# Patient Record
Sex: MALE | Race: WHITE | NOT HISPANIC OR LATINO | Employment: PART TIME | URBAN - METROPOLITAN AREA
[De-identification: names, ages, dates, MRNs, and addresses within clinical notes are randomized per-mention and may not be internally consistent; named-entity substitution may affect disease eponyms.]

---

## 2022-12-27 ENCOUNTER — TELEPHONE (OUTPATIENT)
Dept: SURGERY | Facility: CLINIC | Age: 37
End: 2022-12-27
Payer: MEDICAID

## 2022-12-27 NOTE — TELEPHONE ENCOUNTER
----- Message from Kerry Dahl sent at 12/27/2022 12:01 PM CST -----  Contact: Jc  Patient is calling to speak with the nurse in regards to referral. Reports spoke to Nurse Cordon earlier and forgot to advise that he wanted to be seen at the [place] office. Adv patient referral still has not come in. Patient states referring provider is Rosa Maria Aquino ph [phone]. Please give patient a callback at .[phone]

## 2022-12-27 NOTE — TELEPHONE ENCOUNTER
Returned call to patient he is aware if his appointment date and time with Dr. Colon on 12/05/2022 to discuss possible procedure for infected cyst. The patient voiced understanding.

## 2023-01-05 ENCOUNTER — OFFICE VISIT (OUTPATIENT)
Dept: SURGERY | Facility: CLINIC | Age: 38
End: 2023-01-05
Payer: MEDICAID

## 2023-01-05 VITALS — HEART RATE: 114 BPM | DIASTOLIC BLOOD PRESSURE: 121 MMHG | SYSTOLIC BLOOD PRESSURE: 163 MMHG | WEIGHT: 251 LBS

## 2023-01-05 DIAGNOSIS — L05.01 PILONIDAL CYST WITH ABSCESS: Primary | ICD-10-CM

## 2023-01-05 DIAGNOSIS — E78.00 HIGH CHOLESTEROL: ICD-10-CM

## 2023-01-05 DIAGNOSIS — F31.9 BIPOLAR DEPRESSION: ICD-10-CM

## 2023-01-05 PROBLEM — I10 HTN (HYPERTENSION): Status: ACTIVE | Noted: 2023-01-05

## 2023-01-05 PROCEDURE — 3077F SYST BP >= 140 MM HG: CPT | Mod: CPTII,,, | Performed by: SURGERY

## 2023-01-05 PROCEDURE — 3080F DIAST BP >= 90 MM HG: CPT | Mod: CPTII,,, | Performed by: SURGERY

## 2023-01-05 PROCEDURE — 3080F PR MOST RECENT DIASTOLIC BLOOD PRESSURE >= 90 MM HG: ICD-10-PCS | Mod: CPTII,,, | Performed by: SURGERY

## 2023-01-05 PROCEDURE — 99999 PR PBB SHADOW E&M-EST. PATIENT-LVL III: CPT | Mod: PBBFAC,,, | Performed by: SURGERY

## 2023-01-05 PROCEDURE — 1159F PR MEDICATION LIST DOCUMENTED IN MEDICAL RECORD: ICD-10-PCS | Mod: CPTII,,, | Performed by: SURGERY

## 2023-01-05 PROCEDURE — 99204 PR OFFICE/OUTPT VISIT, NEW, LEVL IV, 45-59 MIN: ICD-10-PCS | Mod: S$PBB,,, | Performed by: SURGERY

## 2023-01-05 PROCEDURE — 3077F PR MOST RECENT SYSTOLIC BLOOD PRESSURE >= 140 MM HG: ICD-10-PCS | Mod: CPTII,,, | Performed by: SURGERY

## 2023-01-05 PROCEDURE — 99999 PR PBB SHADOW E&M-EST. PATIENT-LVL III: ICD-10-PCS | Mod: PBBFAC,,, | Performed by: SURGERY

## 2023-01-05 PROCEDURE — 99213 OFFICE O/P EST LOW 20 MIN: CPT | Mod: PBBFAC,PO | Performed by: SURGERY

## 2023-01-05 PROCEDURE — 1160F PR REVIEW ALL MEDS BY PRESCRIBER/CLIN PHARMACIST DOCUMENTED: ICD-10-PCS | Mod: CPTII,,, | Performed by: SURGERY

## 2023-01-05 PROCEDURE — 1159F MED LIST DOCD IN RCRD: CPT | Mod: CPTII,,, | Performed by: SURGERY

## 2023-01-05 PROCEDURE — 99204 OFFICE O/P NEW MOD 45 MIN: CPT | Mod: S$PBB,,, | Performed by: SURGERY

## 2023-01-05 PROCEDURE — 1160F RVW MEDS BY RX/DR IN RCRD: CPT | Mod: CPTII,,, | Performed by: SURGERY

## 2023-01-05 RX ORDER — IBUPROFEN 600 MG/1
600 TABLET ORAL
COMMUNITY
Start: 2022-09-22

## 2023-01-05 RX ORDER — ONDANSETRON 4 MG/1
8 TABLET, ORALLY DISINTEGRATING ORAL EVERY 8 HOURS PRN
Status: CANCELLED | OUTPATIENT
Start: 2023-01-05

## 2023-01-05 RX ORDER — ATORVASTATIN CALCIUM 20 MG/1
20 TABLET, FILM COATED ORAL
COMMUNITY
Start: 2022-12-24 | End: 2023-01-05

## 2023-01-05 RX ORDER — ERGOCALCIFEROL 1.25 MG/1
50000 CAPSULE ORAL
COMMUNITY
Start: 2022-11-28

## 2023-01-05 RX ORDER — LIDOCAINE HYDROCHLORIDE 10 MG/ML
1 INJECTION, SOLUTION EPIDURAL; INFILTRATION; INTRACAUDAL; PERINEURAL ONCE
Status: DISCONTINUED | OUTPATIENT
Start: 2023-01-05 | End: 2023-01-13 | Stop reason: HOSPADM

## 2023-01-05 RX ORDER — ATORVASTATIN CALCIUM 20 MG/1
TABLET, FILM COATED ORAL
COMMUNITY
Start: 2022-11-28

## 2023-01-05 NOTE — PATIENT INSTRUCTIONS
For pilonidal cyst excision surgery scheduled for January 13th at 10 in the morning.  The hospital call you the night before with a time of arrival.  There are no medications you have to stop.      No solid food after midnight on January 12th but you may have clear liquids up to 3 hours before arriving at the hospital.      If any of your labs are EKG are abnormal we will let you know.      With regards to your hip pain you would need to call your primary care provider and ask for referral to a new or different orthopedic surgeon, pain management doctor, or sports medicine doctor for further evaluation and treatment of this.  This is not something we treat in general surgery.      You will be given narcotic pain medicine for pain control for excision of the pilonidal cyst.  I just want to make sure that you understand I will not be providing narcotic pain medicine for the long-term treatment of your hip pain    If you have any questions or concerns please call the office    Our office phone numbers are  [phone] and

## 2023-01-05 NOTE — PROGRESS NOTES
Patient ID: Jc Madrigal is a 37 y.o. male.    Chronic pilonidal cyst/abscess    Chief Complaint: Consult (Pilonidal cyst )      HPI:  Patient has had chronic drainage and foul-smelling odor from the superior aspect of his gluteal crease.  He has been told that this is a pilonidal cyst/abscess.  Presents now to discuss excision.      The patient spent the majority of his time talking about bilateral hip pain.  He says the hip pain is there in the morning it is constant takes nonsteroidal anti-inflammatories and acetaminophen but does not get any relief.  He was seen by an orthopedic surgeon at [facility] and told he was diagnosed with bursitis and he was given an injection steroids however the pain continues.  The pain is preventing him from doing his activities of daily living and working more than part time.      He is very frustrated and tearful about this.      With regard to this I recommended he contact his primary provider or his insurance provider and obtain a referral to a different with the pedis surgeon, pain management doctor or sports medicine doctor for further evaluation and treatment of his hip pain.    Review of Systems   Constitutional:  Positive for activity change. Negative for unexpected weight change.   HENT:  Negative for hearing loss, rhinorrhea and trouble swallowing.    Eyes:  Negative for discharge and visual disturbance.   Respiratory:  Negative for chest tightness and wheezing.    Cardiovascular:  Negative for chest pain and palpitations.   Gastrointestinal:  Negative for blood in stool, constipation, diarrhea and vomiting.   Endocrine: Positive for polyuria. Negative for polydipsia.   Genitourinary:  Positive for difficulty urinating and urgency. Negative for hematuria.   Musculoskeletal:  Positive for arthralgias (Bilateral hip). Negative for joint swelling and neck pain.   Skin:         Drainage from the superior aspect of the gluteal   Neurological:  Positive for  weakness and headaches.   Psychiatric/Behavioral:  Negative for confusion and dysphoric mood.      Current Outpatient Medications   Medication Sig Dispense Refill    atorvastatin (LIPITOR) 20 MG tablet Take 20 mg by mouth.      atorvastatin (LIPITOR) 20 MG tablet       ergocalciferol (ERGOCALCIFEROL) 50,000 unit Cap Take 50,000 Units by mouth every 7 days.      ibuprofen (ADVIL,MOTRIN) 600 MG tablet Take 600 mg by mouth.       Current Facility-Administered Medications   Medication Dose Route Frequency Provider Last Rate Last Admin    LIDOcaine (PF) 10 mg/ml (1%) injection 10 mg  1 mL Intradermal Once Dorian Colon MD           Review of patient's allergies indicates:  No Known Allergies    History reviewed. No pertinent past medical history.    History reviewed. No pertinent surgical history.    History reviewed. No pertinent family history.    Social History     Socioeconomic History    Marital status: Single       Vitals:    01/05/23 1350   BP: (!) 163/121   Pulse: (!) 114       Physical Exam  Vitals reviewed.   Constitutional:       General: He is not in acute distress.     Appearance: Normal appearance. He is well-developed.      Comments: Stressed   HENT:      Head: Normocephalic and atraumatic.   Eyes:      General: No scleral icterus.     Pupils: Pupils are equal, round, and reactive to light.   Neck:      Thyroid: No thyromegaly.      Vascular: No JVD.      Trachea: No tracheal deviation.   Cardiovascular:      Rate and Rhythm: Normal rate and regular rhythm.      Heart sounds: Normal heart sounds.   Pulmonary:      Effort: Pulmonary effort is normal.      Breath sounds: Normal breath sounds.   Abdominal:      General: Bowel sounds are normal. There is no distension.      Palpations: Abdomen is soft. There is no mass.      Tenderness: There is no abdominal tenderness. There is no guarding or rebound.   Musculoskeletal:         General: Normal range of motion.      Cervical back: Normal range of motion  and neck supple.   Lymphadenopathy:      Cervical: No cervical adenopathy.   Skin:     General: Skin is warm and dry.      Comments: In the midline gluteal crease there is an area of a 0.5 cm long with induration and drainage.  There are 3 punctate lesions beneath   Neurological:      Mental Status: He is alert and oriented to person, place, and time.   Psychiatric:         Behavior: Behavior normal.         Thought Content: Thought content normal.         Judgment: Judgment normal.     Assessment & Plan:   Pilonidal abscess, chronic.      Patient would benefit from excision the wound would be left open to heal by secondary intention.  If for surgery risks benefits and complications were discussed this includes infection, bleeding, recurrence, and delayed healing.      With regard to the bilateral hip pain I recommend the patient contact his primary provider obtain referrals to new physicians for treatment of this.  He did have x-rays at [place] that showed no acute fractures several months ago.  He does however need further evaluation and management either by Orthopedics, sports Medicine, or pain management.      Explained to him that this is not a general surgical issue

## 2023-01-05 NOTE — H&P (VIEW-ONLY)
Patient ID: Jc Madrigal is a 37 y.o. male.    Chronic pilonidal cyst/abscess    Chief Complaint: Consult (Pilonidal cyst )      HPI:  Patient has had chronic drainage and foul-smelling odor from the superior aspect of his gluteal crease.  He has been told that this is a pilonidal cyst/abscess.  Presents now to discuss excision.      The patient spent the majority of his time talking about bilateral hip pain.  He says the hip pain is there in the morning it is constant takes nonsteroidal anti-inflammatories and acetaminophen but does not get any relief.  He was seen by an orthopedic surgeon at [facility] and told he was diagnosed with bursitis and he was given an injection steroids however the pain continues.  The pain is preventing him from doing his activities of daily living and working more than part time.      He is very frustrated and tearful about this.      With regard to this I recommended he contact his primary provider or his insurance provider and obtain a referral to a different with the pedis surgeon, pain management doctor or sports medicine doctor for further evaluation and treatment of his hip pain.    Review of Systems   Constitutional:  Positive for activity change. Negative for unexpected weight change.   HENT:  Negative for hearing loss, rhinorrhea and trouble swallowing.    Eyes:  Negative for discharge and visual disturbance.   Respiratory:  Negative for chest tightness and wheezing.    Cardiovascular:  Negative for chest pain and palpitations.   Gastrointestinal:  Negative for blood in stool, constipation, diarrhea and vomiting.   Endocrine: Positive for polyuria. Negative for polydipsia.   Genitourinary:  Positive for difficulty urinating and urgency. Negative for hematuria.   Musculoskeletal:  Positive for arthralgias (Bilateral hip). Negative for joint swelling and neck pain.   Skin:         Drainage from the superior aspect of the gluteal   Neurological:  Positive for  weakness and headaches.   Psychiatric/Behavioral:  Negative for confusion and dysphoric mood.      Current Outpatient Medications   Medication Sig Dispense Refill    atorvastatin (LIPITOR) 20 MG tablet Take 20 mg by mouth.      atorvastatin (LIPITOR) 20 MG tablet       ergocalciferol (ERGOCALCIFEROL) 50,000 unit Cap Take 50,000 Units by mouth every 7 days.      ibuprofen (ADVIL,MOTRIN) 600 MG tablet Take 600 mg by mouth.       Current Facility-Administered Medications   Medication Dose Route Frequency Provider Last Rate Last Admin    LIDOcaine (PF) 10 mg/ml (1%) injection 10 mg  1 mL Intradermal Once Dorian Colon MD           Review of patient's allergies indicates:  No Known Allergies    History reviewed. No pertinent past medical history.    History reviewed. No pertinent surgical history.    History reviewed. No pertinent family history.    Social History     Socioeconomic History    Marital status: Single       Vitals:    01/05/23 1350   BP: (!) 163/121   Pulse: (!) 114       Physical Exam  Vitals reviewed.   Constitutional:       General: He is not in acute distress.     Appearance: Normal appearance. He is well-developed.      Comments: Stressed   HENT:      Head: Normocephalic and atraumatic.   Eyes:      General: No scleral icterus.     Pupils: Pupils are equal, round, and reactive to light.   Neck:      Thyroid: No thyromegaly.      Vascular: No JVD.      Trachea: No tracheal deviation.   Cardiovascular:      Rate and Rhythm: Normal rate and regular rhythm.      Heart sounds: Normal heart sounds.   Pulmonary:      Effort: Pulmonary effort is normal.      Breath sounds: Normal breath sounds.   Abdominal:      General: Bowel sounds are normal. There is no distension.      Palpations: Abdomen is soft. There is no mass.      Tenderness: There is no abdominal tenderness. There is no guarding or rebound.   Musculoskeletal:         General: Normal range of motion.      Cervical back: Normal range of motion  and neck supple.   Lymphadenopathy:      Cervical: No cervical adenopathy.   Skin:     General: Skin is warm and dry.      Comments: In the midline gluteal crease there is an area of a 0.5 cm long with induration and drainage.  There are 3 punctate lesions beneath   Neurological:      Mental Status: He is alert and oriented to person, place, and time.   Psychiatric:         Behavior: Behavior normal.         Thought Content: Thought content normal.         Judgment: Judgment normal.     Assessment & Plan:   Pilonidal abscess, chronic.      Patient would benefit from excision the wound would be left open to heal by secondary intention.  If for surgery risks benefits and complications were discussed this includes infection, bleeding, recurrence, and delayed healing.      With regard to the bilateral hip pain I recommend the patient contact his primary provider obtain referrals to new physicians for treatment of this.  He did have x-rays at [place] that showed no acute fractures several months ago.  He does however need further evaluation and management either by Orthopedics, sports Medicine, or pain management.      Explained to him that this is not a general surgical issue

## 2023-01-06 ENCOUNTER — TELEPHONE (OUTPATIENT)
Dept: PREADMISSION TESTING | Facility: HOSPITAL | Age: 38
End: 2023-01-06
Payer: MEDICAID

## 2023-01-06 RX ORDER — ACETAMINOPHEN 325 MG/1
325 TABLET ORAL EVERY 6 HOURS PRN
COMMUNITY

## 2023-01-06 RX ORDER — HYDROCODONE BITARTRATE AND ACETAMINOPHEN 10; 325 MG/1; MG/1
1 TABLET ORAL DAILY
COMMUNITY
End: 2023-02-02

## 2023-01-06 NOTE — TELEPHONE ENCOUNTER
Pre op instructions reviewed with pt per phone: Spoke about pre op process and surgery instructions, verbalized understanding.    To confirm, Surgery is scheduled on 1/13/23. We will call you late afternoon the business day prior to surgery with your arrival time.    Please report to the [facility] (1st Floor) located off of [facility] (2nd Entrance/Building on the left, in front of the flag pole).  Address: [address] [place] [address] [place]. [address]        INSTRUCTIONS IMPORTANT!!!  Do Not Eat, Drink, or Smoke after 12 midnight unless instructed otherwise by your Surgeon. OK to brush teeth, no gum, candy or mints!      *Take Only these medications with a small sip of water Morning of Surgery:  None      ____  HOLD all vitamins, herbal supplements, aspirin products & NSAIDS 7 days prior to surgery, as these can thin the blood.  ____  NO Acrylic/fake nails or nail polish worn day of surgery (specifically hand/arm & foot surgeries).  ____  NO powder, lotions, deodorants, oils or cream on body.  ____  Remove all jewelry & piercings prior to surgery.  ____  Remove Dentures, Hearing Aids & Contact Lens prior to surgery.  ____  Bring photo ID and insurance information to hospital (Leave Valuables at Home).  ____  If going home the same day, arrange for a ride home. You will not be able to drive 24 hrs if Anesthesia was used.   ____  Females (ages 11-60): may need to give a urine sample the morning of surgery; please see Pre op Nurse prior to using the restroom.  ____  Men: Stop ED medications (Viagra, Cialis) 24 hrs prior to surgery.  ____  Wear clean, loose fitting clothing to allow for dressings/ bandages.            Diabetic Patients: If you take diabetic medication, do NOT take morning of surgery unless instructed by Doctor. Metformin to be stopped 24 hrs prior to surgery time. DO NOT take long-acting insulin the evening before surgery. Blood sugars will be checked in pre-op by Nurse.    Bathing  Instructions:    -Shower with anti-bacterial Soap (Hibiclens or Dial) the night before surgery and the morning of.   -Do not use Hibiclens on your face or genitals.   -Apply clean clothes after shower.  -Do not shave your face or body 2 days prior to surgery unless instructed otherwise by your Surgeon.  -Do not shave pubic hair 7 days prior to surgery (gyn pt's).    [facility] Visitor/Ride Policy:  Only 2 adults allowed (over the age of 18) to accompany you to the Hospital. You Must have a ride home from a responsible adult that you know and trust. Medical Transport, Uber or Lyft can only be used if patient has a responsible adult to accompany them during ride home.    Discharge Instructions: You will receive Post-op/Discharge instructions by your Discharge Nurse prior to going home. Please call your Surgeon's office with any post-surgery questions/concerns @ [phone].    *If you are running late or have questions the morning of surgery, please call the Surgery Dept @ [phone]  *Insurance/ Financial Questions, please call [phone]    Thank you,  -[facility] Pre Admit Testing Nurse  [phone] or [phone]  M-F 7:30 am-4 pm (Closed Major Holidays)

## 2023-01-09 ENCOUNTER — HOSPITAL ENCOUNTER (OUTPATIENT)
Dept: CARDIOLOGY | Facility: HOSPITAL | Age: 38
Discharge: HOME | End: 2023-01-09
Attending: SURGERY
Payer: MEDICAID

## 2023-01-09 ENCOUNTER — PATIENT MESSAGE (OUTPATIENT)
Dept: PREADMISSION TESTING | Facility: HOSPITAL | Age: 38
End: 2023-01-09
Payer: MEDICAID

## 2023-01-09 DIAGNOSIS — L05.01 PILONIDAL CYST WITH ABSCESS: ICD-10-CM

## 2023-01-09 PROCEDURE — 93005 ELECTROCARDIOGRAM TRACING: CPT | Mod: PO

## 2023-01-09 PROCEDURE — 93010 ELECTROCARDIOGRAM REPORT: CPT | Mod: ,,, | Performed by: INTERNAL MEDICINE

## 2023-01-09 PROCEDURE — 93010 EKG 12-LEAD: ICD-10-PCS | Mod: ,,, | Performed by: INTERNAL MEDICINE

## 2023-01-12 ENCOUNTER — TELEPHONE (OUTPATIENT)
Dept: PREADMISSION TESTING | Facility: HOSPITAL | Age: 38
End: 2023-01-12
Payer: MEDICAID

## 2023-01-13 ENCOUNTER — ANESTHESIA EVENT (OUTPATIENT)
Dept: SURGERY | Facility: HOSPITAL | Age: 38
End: 2023-01-13
Payer: MEDICAID

## 2023-01-13 ENCOUNTER — ANESTHESIA (OUTPATIENT)
Dept: SURGERY | Facility: HOSPITAL | Age: 38
End: 2023-01-13
Payer: MEDICAID

## 2023-01-13 ENCOUNTER — HOSPITAL ENCOUNTER (OUTPATIENT)
Facility: HOSPITAL | Age: 38
Discharge: HOME | End: 2023-01-14
Attending: SURGERY | Admitting: SURGERY
Payer: MEDICAID

## 2023-01-13 DIAGNOSIS — L05.01 PILONIDAL CYST WITH ABSCESS: Primary | ICD-10-CM

## 2023-01-13 PROCEDURE — 71000039 HC RECOVERY, EACH ADD'L HOUR: Performed by: SURGERY

## 2023-01-13 PROCEDURE — 71000015 HC POSTOP RECOV 1ST HR: Performed by: SURGERY

## 2023-01-13 PROCEDURE — 11771 EXC PILONIDAL CYST XTNSV: CPT | Mod: ,,, | Performed by: SURGERY

## 2023-01-13 PROCEDURE — 63600175 PHARM REV CODE 636 W HCPCS: Performed by: FAMILY MEDICINE

## 2023-01-13 PROCEDURE — 36000706: Performed by: SURGERY

## 2023-01-13 PROCEDURE — 25000003 PHARM REV CODE 250: Performed by: SURGERY

## 2023-01-13 PROCEDURE — S4991 NICOTINE PATCH NONLEGEND: HCPCS | Performed by: SURGERY

## 2023-01-13 PROCEDURE — 37000009 HC ANESTHESIA EA ADD 15 MINS: Performed by: SURGERY

## 2023-01-13 PROCEDURE — 71000033 HC RECOVERY, INTIAL HOUR: Performed by: SURGERY

## 2023-01-13 PROCEDURE — 63600175 PHARM REV CODE 636 W HCPCS: Performed by: ANESTHESIOLOGY

## 2023-01-13 PROCEDURE — 63600175 PHARM REV CODE 636 W HCPCS: Performed by: SURGERY

## 2023-01-13 PROCEDURE — 11771 PR REMV PILONIDAL LESION EXTENS: ICD-10-PCS | Mod: ,,, | Performed by: SURGERY

## 2023-01-13 PROCEDURE — 94799 UNLISTED PULMONARY SVC/PX: CPT

## 2023-01-13 PROCEDURE — 37000008 HC ANESTHESIA 1ST 15 MINUTES: Performed by: SURGERY

## 2023-01-13 PROCEDURE — 88304 TISSUE EXAM BY PATHOLOGIST: CPT | Performed by: PATHOLOGY

## 2023-01-13 PROCEDURE — 00300 ANES ALL PX INTEG H/N/PTRUNK: CPT | Performed by: SURGERY

## 2023-01-13 PROCEDURE — 36000707: Performed by: SURGERY

## 2023-01-13 PROCEDURE — 25000003 PHARM REV CODE 250: Performed by: FAMILY MEDICINE

## 2023-01-13 PROCEDURE — 88304 PR  SURG PATH,LEVEL III: ICD-10-PCS | Mod: 26,,, | Performed by: PATHOLOGY

## 2023-01-13 PROCEDURE — 99900035 HC TECH TIME PER 15 MIN (STAT)

## 2023-01-13 PROCEDURE — 71000016 HC POSTOP RECOV ADDL HR: Performed by: SURGERY

## 2023-01-13 PROCEDURE — 88304 TISSUE EXAM BY PATHOLOGIST: CPT | Mod: 26,,, | Performed by: PATHOLOGY

## 2023-01-13 RX ORDER — OXYCODONE HYDROCHLORIDE 5 MG/1
5 TABLET ORAL
Status: DISCONTINUED | OUTPATIENT
Start: 2023-01-13 | End: 2023-01-13 | Stop reason: HOSPADM

## 2023-01-13 RX ORDER — CLINDAMYCIN PHOSPHATE 900 MG/50ML
900 INJECTION, SOLUTION INTRAVENOUS
Status: COMPLETED | OUTPATIENT
Start: 2023-01-13 | End: 2023-01-13

## 2023-01-13 RX ORDER — IBUPROFEN 200 MG
1 TABLET ORAL DAILY
Status: DISCONTINUED | OUTPATIENT
Start: 2023-01-13 | End: 2023-01-14 | Stop reason: HOSPADM

## 2023-01-13 RX ORDER — PROPOFOL 10 MG/ML
VIAL (ML) INTRAVENOUS
Status: DISCONTINUED | OUTPATIENT
Start: 2023-01-13 | End: 2023-01-13

## 2023-01-13 RX ORDER — DIPHENHYDRAMINE HYDROCHLORIDE 50 MG/ML
25 INJECTION INTRAMUSCULAR; INTRAVENOUS EVERY 6 HOURS PRN
Status: DISCONTINUED | OUTPATIENT
Start: 2023-01-13 | End: 2023-01-13 | Stop reason: HOSPADM

## 2023-01-13 RX ORDER — HYDROMORPHONE HYDROCHLORIDE 2 MG/ML
1 INJECTION, SOLUTION INTRAMUSCULAR; INTRAVENOUS; SUBCUTANEOUS EVERY 4 HOURS PRN
Status: DISCONTINUED | OUTPATIENT
Start: 2023-01-13 | End: 2023-01-13 | Stop reason: SDUPTHER

## 2023-01-13 RX ORDER — LIDOCAINE HYDROCHLORIDE 20 MG/ML
INJECTION, SOLUTION EPIDURAL; INFILTRATION; INTRACAUDAL; PERINEURAL
Status: DISCONTINUED | OUTPATIENT
Start: 2023-01-13 | End: 2023-01-13

## 2023-01-13 RX ORDER — CHLORHEXIDINE GLUCONATE ORAL RINSE 1.2 MG/ML
10 SOLUTION DENTAL 2 TIMES DAILY
Status: DISCONTINUED | OUTPATIENT
Start: 2023-01-13 | End: 2023-01-14 | Stop reason: HOSPADM

## 2023-01-13 RX ORDER — OXYCODONE AND ACETAMINOPHEN 10; 325 MG/1; MG/1
1 TABLET ORAL EVERY 4 HOURS PRN
Status: DISCONTINUED | OUTPATIENT
Start: 2023-01-13 | End: 2023-01-13 | Stop reason: SDUPTHER

## 2023-01-13 RX ORDER — ONDANSETRON 8 MG/1
8 TABLET, ORALLY DISINTEGRATING ORAL EVERY 8 HOURS PRN
Status: DISCONTINUED | OUTPATIENT
Start: 2023-01-13 | End: 2023-01-13 | Stop reason: HOSPADM

## 2023-01-13 RX ORDER — OXYCODONE AND ACETAMINOPHEN 7.5; 325 MG/1; MG/1
1 TABLET ORAL EVERY 4 HOURS PRN
Status: DISCONTINUED | OUTPATIENT
Start: 2023-01-13 | End: 2023-01-14 | Stop reason: HOSPADM

## 2023-01-13 RX ORDER — ROCURONIUM BROMIDE 10 MG/ML
INJECTION, SOLUTION INTRAVENOUS
Status: DISCONTINUED | OUTPATIENT
Start: 2023-01-13 | End: 2023-01-13

## 2023-01-13 RX ORDER — PROCHLORPERAZINE EDISYLATE 5 MG/ML
5 INJECTION INTRAMUSCULAR; INTRAVENOUS EVERY 30 MIN PRN
Status: DISCONTINUED | OUTPATIENT
Start: 2023-01-13 | End: 2023-01-13 | Stop reason: HOSPADM

## 2023-01-13 RX ORDER — HYDRALAZINE HYDROCHLORIDE 10 MG/1
10 TABLET, FILM COATED ORAL EVERY 6 HOURS PRN
Status: DISCONTINUED | OUTPATIENT
Start: 2023-01-13 | End: 2023-01-14 | Stop reason: HOSPADM

## 2023-01-13 RX ORDER — DIPHENHYDRAMINE HCL 25 MG
25 CAPSULE ORAL EVERY 6 HOURS PRN
Status: DISCONTINUED | OUTPATIENT
Start: 2023-01-13 | End: 2023-01-14 | Stop reason: HOSPADM

## 2023-01-13 RX ORDER — KETOROLAC TROMETHAMINE 30 MG/ML
15 INJECTION, SOLUTION INTRAMUSCULAR; INTRAVENOUS EVERY 8 HOURS PRN
Status: DISCONTINUED | OUTPATIENT
Start: 2023-01-13 | End: 2023-01-13 | Stop reason: HOSPADM

## 2023-01-13 RX ORDER — OXYCODONE AND ACETAMINOPHEN 7.5; 325 MG/1; MG/1
1 TABLET ORAL EVERY 6 HOURS PRN
Qty: 24 TABLET | Refills: 0 | Status: SHIPPED | OUTPATIENT
Start: 2023-01-13 | End: 2023-02-02 | Stop reason: SDUPTHER

## 2023-01-13 RX ORDER — OXYCODONE AND ACETAMINOPHEN 7.5; 325 MG/1; MG/1
1 TABLET ORAL EVERY 4 HOURS PRN
Status: DISCONTINUED | OUTPATIENT
Start: 2023-01-13 | End: 2023-01-13 | Stop reason: SDUPTHER

## 2023-01-13 RX ORDER — SODIUM CHLORIDE 0.9 % (FLUSH) 0.9 %
3 SYRINGE (ML) INJECTION
Status: DISCONTINUED | OUTPATIENT
Start: 2023-01-13 | End: 2023-01-14 | Stop reason: HOSPADM

## 2023-01-13 RX ORDER — KETAMINE HCL IN 0.9 % NACL 50 MG/5 ML
SYRINGE (ML) INTRAVENOUS
Status: DISCONTINUED | OUTPATIENT
Start: 2023-01-13 | End: 2023-01-13

## 2023-01-13 RX ORDER — SODIUM CHLORIDE, SODIUM LACTATE, POTASSIUM CHLORIDE, CALCIUM CHLORIDE 600; 310; 30; 20 MG/100ML; MG/100ML; MG/100ML; MG/100ML
INJECTION, SOLUTION INTRAVENOUS CONTINUOUS
Status: DISCONTINUED | OUTPATIENT
Start: 2023-01-13 | End: 2023-01-14 | Stop reason: HOSPADM

## 2023-01-13 RX ORDER — OXYCODONE AND ACETAMINOPHEN 10; 325 MG/1; MG/1
1 TABLET ORAL EVERY 4 HOURS PRN
Status: DISCONTINUED | OUTPATIENT
Start: 2023-01-13 | End: 2023-01-14 | Stop reason: HOSPADM

## 2023-01-13 RX ORDER — ONDANSETRON 8 MG/1
8 TABLET, ORALLY DISINTEGRATING ORAL EVERY 8 HOURS PRN
Status: DISCONTINUED | OUTPATIENT
Start: 2023-01-13 | End: 2023-01-14 | Stop reason: HOSPADM

## 2023-01-13 RX ORDER — FENTANYL CITRATE 50 UG/ML
INJECTION, SOLUTION INTRAMUSCULAR; INTRAVENOUS
Status: DISCONTINUED | OUTPATIENT
Start: 2023-01-13 | End: 2023-01-13

## 2023-01-13 RX ORDER — BUPIVACAINE HYDROCHLORIDE 2.5 MG/ML
INJECTION, SOLUTION EPIDURAL; INFILTRATION; INTRACAUDAL
Status: DISCONTINUED | OUTPATIENT
Start: 2023-01-13 | End: 2023-01-13 | Stop reason: HOSPADM

## 2023-01-13 RX ORDER — MIDAZOLAM HYDROCHLORIDE 1 MG/ML
INJECTION, SOLUTION INTRAMUSCULAR; INTRAVENOUS
Status: DISCONTINUED | OUTPATIENT
Start: 2023-01-13 | End: 2023-01-13

## 2023-01-13 RX ORDER — MEPERIDINE HYDROCHLORIDE 25 MG/ML
12.5 INJECTION INTRAMUSCULAR; INTRAVENOUS; SUBCUTANEOUS ONCE
Status: DISCONTINUED | OUTPATIENT
Start: 2023-01-13 | End: 2023-01-13 | Stop reason: HOSPADM

## 2023-01-13 RX ORDER — ONDANSETRON 2 MG/ML
4 INJECTION INTRAMUSCULAR; INTRAVENOUS DAILY PRN
Status: DISCONTINUED | OUTPATIENT
Start: 2023-01-13 | End: 2023-01-13 | Stop reason: HOSPADM

## 2023-01-13 RX ORDER — HYDROMORPHONE HYDROCHLORIDE 2 MG/ML
1 INJECTION, SOLUTION INTRAMUSCULAR; INTRAVENOUS; SUBCUTANEOUS EVERY 4 HOURS PRN
Status: DISCONTINUED | OUTPATIENT
Start: 2023-01-13 | End: 2023-01-14 | Stop reason: HOSPADM

## 2023-01-13 RX ORDER — HYDROMORPHONE HYDROCHLORIDE 2 MG/ML
0.2 INJECTION, SOLUTION INTRAMUSCULAR; INTRAVENOUS; SUBCUTANEOUS EVERY 5 MIN PRN
Status: DISCONTINUED | OUTPATIENT
Start: 2023-01-13 | End: 2023-01-13 | Stop reason: HOSPADM

## 2023-01-13 RX ADMIN — SODIUM CHLORIDE, POTASSIUM CHLORIDE, SODIUM LACTATE AND CALCIUM CHLORIDE: 600; 310; 30; 20 INJECTION, SOLUTION INTRAVENOUS at 09:01

## 2023-01-13 RX ADMIN — MIDAZOLAM 2 MG: 1 INJECTION INTRAMUSCULAR; INTRAVENOUS at 09:01

## 2023-01-13 RX ADMIN — PROPOFOL 200 MG: 10 INJECTION, EMULSION INTRAVENOUS at 09:01

## 2023-01-13 RX ADMIN — Medication 50 MG: at 09:01

## 2023-01-13 RX ADMIN — CHLORHEXIDINE GLUCONATE 0.12% ORAL RINSE 10 ML: 1.2 LIQUID ORAL at 09:01

## 2023-01-13 RX ADMIN — SODIUM CHLORIDE, SODIUM LACTATE, POTASSIUM CHLORIDE, AND CALCIUM CHLORIDE: .6; .31; .03; .02 INJECTION, SOLUTION INTRAVENOUS at 09:01

## 2023-01-13 RX ADMIN — ROCURONIUM BROMIDE 50 MG: 10 INJECTION, SOLUTION INTRAVENOUS at 09:01

## 2023-01-13 RX ADMIN — HYDROMORPHONE HYDROCHLORIDE 0.2 MG: 2 INJECTION INTRAMUSCULAR; INTRAVENOUS; SUBCUTANEOUS at 01:01

## 2023-01-13 RX ADMIN — SODIUM CHLORIDE, POTASSIUM CHLORIDE, SODIUM LACTATE AND CALCIUM CHLORIDE: 600; 310; 30; 20 INJECTION, SOLUTION INTRAVENOUS at 04:01

## 2023-01-13 RX ADMIN — CLINDAMYCIN PHOSPHATE 900 MG: 18 INJECTION, SOLUTION INTRAVENOUS at 09:01

## 2023-01-13 RX ADMIN — OXYCODONE AND ACETAMINOPHEN 1 TABLET: 10; 325 TABLET ORAL at 09:01

## 2023-01-13 RX ADMIN — FENTANYL CITRATE 100 MCG: 50 INJECTION, SOLUTION INTRAMUSCULAR; INTRAVENOUS at 09:01

## 2023-01-13 RX ADMIN — NICOTINE 1 PATCH: 21 PATCH, EXTENDED RELEASE TRANSDERMAL at 04:01

## 2023-01-13 RX ADMIN — LIDOCAINE HYDROCHLORIDE 50 MG: 20 INJECTION, SOLUTION EPIDURAL; INFILTRATION; INTRACAUDAL; PERINEURAL at 09:01

## 2023-01-13 RX ADMIN — HYDRALAZINE HYDROCHLORIDE 10 MG: 10 TABLET, FILM COATED ORAL at 05:01

## 2023-01-13 NOTE — OP NOTE
[facility] - Surgery ([facility])  Operative Note      Date of Procedure: 1/13/2023     Procedure: Procedure(s) (LRB):  EXCISION, PILONIDAL CYST (N/A)     Surgeon(s) and Role:     * Dorian Colon MD - Primary       Radha Townsend PA-C Assistant      Assisting Surgeon: None    Pre-Operative Diagnosis: Pilonidal cyst with abscess [L05.01]    Post-Operative Diagnosis: Post-Op Diagnosis Codes:     * Pilonidal cyst with abscess [L05.01]    Anesthesia: General    Operative Findings (including complications, if any):     Chronic pilonidal cyst/abscess.  Final excision size 10 x 3.5 x 2.5 cm    Description of Technical Procedures:     Patient was brought into the operating room placed on the operative table in the prone position after being intubated on the transport cart.  Pneumatic compression devices were placed.  Antibiotics were administered.  The lower back and upper buttocks were clipped prepped and draped in the standard fashion.      A time-out was performed.  An elliptical incision was made to include the area of abscess drainage in the punctate openings of the pilonidal cyst/abscess.      Skin and subcutaneous tissues was then sharply excised using electrocautery down to level the presacral fascia.      Hemostasis was achieved.  Marcaine was infiltrated.  Hemostasis was ensured.  The wound was packed with Kerlix and covered by an abdominal pad.  Surgical underwear were used to hold the dressing in place.      Patient was then placed on the transport cart, extubated and transferred to the recovery room in stable condition.  Final counts were correct    Significant Surgical Tasks Conducted by the Assistant(s), if Applicable:  Assistance with excision, Marcaine and wound packing        Pilonidal abscess site after excision    Estimated Blood Loss (EBL): 15 mL  Six hundred Marcaine 0 point           Implants: * No implants in log *    Specimens:   Specimen (24h ago, onward)       Start     Ordered    01/13/23 1009   Specimen to Pathology, Surgery General Surgery  Once        Comments: Pre-op Diagnosis: Pilonidal cyst with abscess [L05.01]Procedure(s):EXCISION, PILONIDAL CYST Number of specimens: 1Name of specimens: 1)  pilonidal cyst and abscess     References:    Click here for ordering Quick Tip   Question Answer Comment   Procedure Type: General Surgery    Specimen Class: Routine/Screening    Which provider would you like to cc? MONTSERRAT COLON        01/13/23 1014                            Condition: Stable    Disposition: PACU - hemodynamically stable.    Attestation: I performed the procedure.    Discharge Note    OUTCOME: Patient tolerated treatment/procedure well without complication and is now ready for discharge.    Excision of a cyst with abscess    DISPOSITION: Home or Self Care    FINAL DIAGNOSIS:  Pilonidal cyst with abscess    FOLLOWUP: In clinic    DISCHARGE INSTRUCTIONS:    Discharge Procedure Orders   Diet general     Call MD for:  temperature >100.4     Call MD for:  persistent nausea and vomiting     Call MD for:  severe uncontrolled pain     Call MD for:  difficulty breathing, headache or visual disturbances     Call MD for:  redness, tenderness, or signs of infection (pain, swelling, redness, odor or green/yellow discharge around incision site)     Wound care routine (specify)   Order Comments: Wound care routine:  Soak in the tub or get in the shower and let the gauze in the wound get wet.  Removed the gauze and gently fill the wound with clean gauze.  Change the dressing once or twice a day as needed     Activity as tolerated        Clinical Reference Documents Added to Patient Instructions         Document    HOW TO CHANGE A WET TO DRY DRESSING (ENGLISH)

## 2023-01-13 NOTE — PROGRESS NOTES
Patient had an episode of bleeding.  This required several sutures to be placed in silver nitrate.  The wound was packed.  Blood loss was proximally 75 mL.      Will observe the patient overnight make sure there was no bleeding with the 1st dressing change.  Anticipate discharge tomorrow

## 2023-01-13 NOTE — PLAN OF CARE
Pt was about to be discharged home when large amount of  bleeding was noted from the dressing. Dr Colon was brought to pt bedside to assess pt. Bleeding was stopped and wound redressed. Decision was made by Dr Colon for patient to stay overnight for observation. New orders were put into computer by Dr Colon.

## 2023-01-13 NOTE — HPI
Patient has had chronic drainage and foul-smelling odor from the superior aspect of his gluteal crease.  He has been told that this is a pilonidal cyst/abscess.  Presents now to discuss excision.       The patient spent the majority of his time talking about bilateral hip pain.  He says the hip pain is there in the morning it is constant takes nonsteroidal anti-inflammatories and acetaminophen but does not get any relief.  He was seen by an orthopedic surgeon at [facility] and told he was diagnosed with bursitis and he was given an injection steroids however the pain continues.  The pain is preventing him from doing his activities of daily living and working more than part time.       He is very frustrated and tearful about this.       With regard to this I recommended he contact his primary provider or his insurance provider and obtain a referral to a different with the pedis surgeon, pain management doctor or sports medicine doctor for further evaluation and treatment of his hip pain.

## 2023-01-13 NOTE — PLAN OF CARE
Discussed poc with pt, pt verbalized understanding    Purposeful rounding every 2hours    VS wnl  Fall precautions in place, remains injury free  Pain under control with PRN meds    IVFs infusing  Bed locked at lowest position  Call light within reach    Chart check complete  Will cont with POC

## 2023-01-13 NOTE — ANESTHESIA PREPROCEDURE EVALUATION
01/13/2023  Jc Madrigal is a 37 y.o., male w/ pilonidal cyst (gluteal crease).    Patient Active Problem List   Diagnosis    HTN (hypertension)    Bipolar depression    High cholesterol     Past Medical History:   Diagnosis Date    Diverticulitis     Hip pain     left    Kidney stones      Past Surgical History:   Procedure Laterality Date    APPENDECTOMY      HERNIA REPAIR      TONSILLECTOMY         Pre-op Assessment    I have reviewed the Patient Summary Reports.    I have reviewed the NPO Status.   I have reviewed the Medications.     Review of Systems  Anesthesia Hx:  No problems with previous Anesthesia  History of prior surgery of interest to airway management or planning: Denies Family Hx of Anesthesia complications.   Denies Personal Hx of Anesthesia complications.   Social:  Smoker    Hematology/Oncology:  Hematology Normal   Oncology Normal     Cardiovascular:   Hypertension ECG has been reviewed. EKG (1/5/23):  Sinus tachycardia (113 bpm)  Nonspecific ST abnormality   Abnormal ECG   No previous ECGs available   Confirmed by KELLEY MAHONEY MD ([id number]) on 1/10/2023 2:58:04 PM    Pulmonary:  Pulmonary Normal  Denies Sleep Apnea.    Renal/:   renal calculi    Hepatic/GI:   Diverticulitis    Musculoskeletal:  Musculoskeletal Normal    Neurological:  Neurology Normal    Endocrine:  Endocrine Normal  Obesity / BMI > 30  Psych:   Psychiatric History BiPolar depression         Physical Exam  General: Alert and Oriented    Airway:  Mallampati: III   Mouth Opening: Normal  TM Distance: Normal  Tongue: Normal  Neck ROM: Normal ROM        Anesthesia Plan  Type of Anesthesia, risks & benefits discussed:    Anesthesia Type: Gen ETT, Gen Supraglottic Airway  Induction:  IV  Airway Plan: Video  Informed Consent: Informed consent signed with the Patient and all parties understand the risks and  agree with anesthesia plan.  All questions answered.   ASA Score: 3  Day of Surgery Review of History & Physical: I have interviewed and examined the patient. I have reviewed the patient's H&P dated:     Ready For Surgery From Anesthesia Perspective.     .

## 2023-01-13 NOTE — TRANSFER OF CARE
"Anesthesia Transfer of Care Note    Patient: Jc Madrigal    Procedure(s) Performed: Procedure(s) (LRB):  EXCISION, PILONIDAL CYST (N/A)    Patient location: PACU    Anesthesia Type: general    Transport from OR: Transported from OR on room air with adequate spontaneous ventilation    Post pain: adequate analgesia    Post assessment: no apparent anesthetic complications    Post vital signs: stable    Level of consciousness: sedated    Nausea/Vomiting: no nausea/vomiting    Complications: none    Transfer of care protocol was followed      Last vitals:   Visit Vitals  BP (!) 169/107   Pulse 87   Temp 37.1 °C (98.8 °F) (Temporal)   Resp 18   Ht 5' 11" (1.803 m)   Wt 110.6 kg (243 lb 15 oz)   SpO2 96%   BMI 34.02 kg/m²     "

## 2023-01-13 NOTE — ANESTHESIA POSTPROCEDURE EVALUATION
Anesthesia Post Evaluation    Patient: Jc Madrigal    Procedure(s) Performed: Procedure(s) (LRB):  EXCISION, PILONIDAL CYST (N/A)    Final Anesthesia Type: general      Patient location during evaluation: PACU  Patient participation: Yes- Able to Participate  Level of consciousness: awake  Post-procedure vital signs: reviewed and stable  Pain management: adequate  Airway patency: patent    PONV status at discharge: No PONV  Anesthetic complications: no      Cardiovascular status: hemodynamically stable  Respiratory status: unassisted  Hydration status: euvolemic  Follow-up not needed.          Vitals Value Taken Time   /85 01/13/23 1156   Temp 36.4 °C (97.5 °F) 01/13/23 1030   Pulse 102 01/13/23 1158   Resp 18 01/13/23 1325   SpO2 93 % 01/13/23 1158   Vitals shown include unvalidated device data.      Event Time   Out of Recovery 11:50:00         Pain/Javier Score: Pain Rating Prior to Med Admin: 10 (1/13/2023  1:25 PM)  Javier Score: 8 (1/13/2023 11:45 AM)         Outpatient Note for established Patient - regular Visit    History Obtained From:  patient, electronic medical record  Is the patient new to me ? - No    HISTORY OF PRESENT ILLNESS:   The patient is a 62 y.o. female who is here today for :  Arabella Allen was seen today for hypertension and anxiety. Diagnoses and all orders for this visit:    Essential hypertension  BP is well controlled   He BP her soft. Anxiety  She is on Klonipin once a day. She does not feel depression. She does have a lot of work related stress. Restless legs syndrome (RLS)    Mixed hyperlipidemia  Lab Results   Component Value Date    CHOL 179 06/04/2018    CHOL 170 11/14/2017    CHOL 175 05/04/2017     Lab Results   Component Value Date    TRIG 145 06/04/2018    TRIG 258 (H) 11/14/2017    TRIG 183 (H) 05/04/2017     Lab Results   Component Value Date    HDL 53 06/04/2018    HDL 46 11/14/2017    HDL 47 05/04/2017     Lab Results   Component Value Date    LDLCALC 97 06/04/2018    LDLCALC 72 11/14/2017    LDLCALC 91 05/04/2017     Lab Results   Component Value Date    LABVLDL 29 06/04/2018    LABVLDL 52 11/14/2017    LABVLDL 37 05/04/2017       she was in [place] in may . She had acute illness : GI Sx (fever, diarrhea, muscle aches). Since then she has Sx of diarrhea (not daily). She took probiotics which made her worse. She still take Linzess. She is on Linzess 145 mg daily. Pt denies CP/SOB/palpitations/abdominal pain/N/V. No fever. Chills. No blood in the stool. No sore thoat, occasional stress related headache (not new). She has psoritauc arthritis and she is on Etanercept nd NSAIDS and epr opt bronwyn is doing very well now.      Past Medical History:        Diagnosis Date    Actinic keratosis 6/28/2010    Allergic rhinitis     Anxiety 6/28/2010    Cancer (HCC)     ovarian    Chronic idiopathic constipation 1/19/2017    Hemifacial spasm     Botox    Hyperlipidemia     Osteoarthritis     Psoriasis     Psoriatic arthritis [facility])        Social History:   TOBACCO:   reports that she has never smoked. She has never used smokeless tobacco.  ETOH:   reports that she drinks alcohol. Current Medications:    Prior to Admission medications    Medication Sig Start Date End Date Taking? Authorizing Provider   clonazePAM (KLONOPIN) 0.5 MG tablet Take 1 tablet by mouth daily as needed for Anxiety for up to 90 days. 8/15/19 11/13/19 Yes Katerine Chan MD   meloxicam (MOBIC) 15 MG tablet Take 1 tablet by mouth daily TAKE 1 TABLET DAILY 8/15/19  Yes Katerine Chan MD   atorvastatin (LIPITOR) 20 MG tablet Take 1 tablet by mouth daily 8/15/19  Yes Katerine Chan MD   lisinopril (PRINIVIL;ZESTRIL) 20 MG tablet Take 1 tablet by mouth daily 8/15/19  Yes Katerine Chan MD   linaCLOtide (LINZESS) 72 MCG CAPS capsule Take 1 capsule by mouth every morning (before breakfast) 8/15/19  Yes Katerine Chan MD   Etanercept 50 MG/ML SOAJ Inject 50 mg sc once a week- same of the week 6/28/19   Kenyatta Panda MD   clobetasol (TEMOVATE) 0.05 % ointment Apply to affected area BID PRN flares 12/6/18   Abhijeet Sousa MD   clobetasol (TEMOVATE) 0.05 % external solution Apply to affected area BID PRN flares 12/3/18   Sandeep Espinal MD   CLOBEX SPRAY 0.05 % LIQD Apply to scalp daily - bid prn flares. 8/20/18   Abhijeet Sousa MD   Estradiol (YUVAFEM) 10 MCG TABS vaginal tablet INSERT 1 TABLET VAGINALLY TWICE WEEKLY 7/5/18   Historical Provider, MD   cetirizine (ZYRTEC) 10 MG tablet Take 10 mg by mouth 3/1/10   Historical Provider, MD   tretinoin (RETIN-A) 0.05 % cream Apply a pea sized amount to the face QHS 5/30/17   Abhijeet Sousa MD   fluticasone (FLONASE) 50 MCG/ACT nasal spray 1 spray by Nasal route daily    Historical Provider, MD   Ascorbic Acid (VITAMIN C) 500 MG tablet Take 1,000 mg by mouth daily.     Historical Provider, MD   Cholecalciferol (VITAMIN D) 1000 UNIT CAPS Take 1 capsule by

## 2023-01-13 NOTE — ANESTHESIA PROCEDURE NOTES
Intubation    Date/Time: 1/13/2023 9:39 AM  Performed by: Herman Cisneros CRNA  Authorized by: Shaq Griffin MD     Intubation:     Induction:  Intravenous    Intubated:  Postinduction    Mask Ventilation:  Easy with oral airway    Attempts:  1    Attempted By:  CRNA    Method of Intubation:  Video laryngoscopy    Blade:  Glidescope 3    Laryngeal View Grade: Grade I - full view of cords      Difficult Airway Encountered?: No      Complications:  None    Airway Device:  Oral endotracheal tube    Airway Device Size:  7.5    Style/Cuff Inflation:  Cuffed (inflated to minimal occlusive pressure)    Tube secured:  22    Placement Verified By:  Capnometry    Complicating Factors:  Retrognathia, large prominent central incisors, short neck and anterior larynx    Findings Post-Intubation:  BS equal bilateral

## 2023-01-14 VITALS
RESPIRATION RATE: 18 BRPM | HEART RATE: 86 BPM | SYSTOLIC BLOOD PRESSURE: 153 MMHG | TEMPERATURE: 98 F | HEIGHT: 71 IN | WEIGHT: 243.19 LBS | BODY MASS INDEX: 34.05 KG/M2 | DIASTOLIC BLOOD PRESSURE: 92 MMHG | OXYGEN SATURATION: 98 %

## 2023-01-14 PROCEDURE — 94761 N-INVAS EAR/PLS OXIMETRY MLT: CPT

## 2023-01-14 PROCEDURE — S4991 NICOTINE PATCH NONLEGEND: HCPCS | Performed by: SURGERY

## 2023-01-14 PROCEDURE — 94799 UNLISTED PULMONARY SVC/PX: CPT

## 2023-01-14 PROCEDURE — 99900035 HC TECH TIME PER 15 MIN (STAT)

## 2023-01-14 PROCEDURE — 25000003 PHARM REV CODE 250: Performed by: SURGERY

## 2023-01-14 RX ADMIN — CHLORHEXIDINE GLUCONATE 0.12% ORAL RINSE 10 ML: 1.2 LIQUID ORAL at 09:01

## 2023-01-14 RX ADMIN — OXYCODONE AND ACETAMINOPHEN 1 TABLET: 10; 325 TABLET ORAL at 06:01

## 2023-01-14 RX ADMIN — NICOTINE 1 PATCH: 21 PATCH, EXTENDED RELEASE TRANSDERMAL at 09:01

## 2023-01-14 NOTE — PLAN OF CARE
Pt remains free from falls/injuries this shift. Safety precautions maintained. Pain managed with pain medication and rest. IVF infusing per orders. No s/s of acute distress noted. Will continue to monitor. Chart check completed.

## 2023-01-14 NOTE — PLAN OF CARE
[facility] - Med Surg  Discharge Final Note    Primary Care Provider: RODNEY Bal    Expected Discharge Date: 1/14/2023    Final Discharge Note (most recent)       Final Note - 01/14/23 0942          Final Note    Assessment Type Final Discharge Note     Anticipated Discharge Disposition Home or Self Care     Hospital Resources/Appts/Education Provided Appointments scheduled and added to AVS        Post-Acute Status    Discharge Delays None known at this time                   Pt to discharge home today. Post-op scheduled with Dr Colon: 2/2/23 @ 4:20PM    No CM needs for discharge.     Important Message from Medicare             Contact Info       Dorian Colon MD   Specialty: General Surgery    [address]  [place] [address]   Phone: [phone]       Next Steps: Follow up on 2/2/2023    Instructions: post op appt or as scheduled

## 2023-01-14 NOTE — NURSING
Pt being discharged Home in stable condition. IV removed, catheter intact, pt tolerated well. Tele monitor removed, given to US. Discharge instructions given to pt, pt verbalized understanding.    Pain medication prescription with patient at bedside at time of discharge. Supplies given to pt for dressing changes at home.

## 2023-01-14 NOTE — DISCHARGE SUMMARY
[facility]  General Surgery  Discharge Summary      Patient Name: Jc Madrigal  MRN: [id number]  Admission Date: 1/13/2023  Hospital Length of Stay: 0 days  Discharge Date and Time:  01/14/2023 9:34 AM  Attending Physician: Dorian Colon MD   Discharging Provider: Fernando Almaguer MD  Primary Care Provider: RODNEY Bal    HPI:   Patient has had chronic drainage and foul-smelling odor from the superior aspect of his gluteal crease.  He has been told that this is a pilonidal cyst/abscess.  Presents now to discuss excision.       The patient spent the majority of his time talking about bilateral hip pain.  He says the hip pain is there in the morning it is constant takes nonsteroidal anti-inflammatories and acetaminophen but does not get any relief.  He was seen by an orthopedic surgeon at [facility] and told he was diagnosed with bursitis and he was given an injection steroids however the pain continues.  The pain is preventing him from doing his activities of daily living and working more than part time.       He is very frustrated and tearful about this.       With regard to this I recommended he contact his primary provider or his insurance provider and obtain a referral to a different with the pedis surgeon, pain management doctor or sports medicine doctor for further evaluation and treatment of his hip pain.         Procedure(s) (LRB):  EXCISION, PILONIDAL CYST (N/A)      Indwelling Lines/Drains at time of discharge:   Lines/Drains/Airways     Airway  Duration                Airway - Non-Surgical 01/13/23 0939 <1 day              Hospital Course: Patient underwent a pilonidal cyst excision on 01/13/2023.  He was kept overnight due to some postoperative oozing from the wound in the recovery room.  He had a dressing change on the morning of postoperative day 1. Without evidence of further bleeding.  The wound was redressed and education was provided on wound care to both the  patient and his partner.  He was discharged home in stable condition with follow-up with Dr. Colon as previously planned.      Goals of Care Treatment Preferences:  Code Status: Full Code      Pending Diagnostic Studies:     Procedure Component Value Units Date/Time    Specimen to Pathology, Surgery General Surgery [[id number]] Collected: 01/13/23 1015    Order Status: Sent Lab Status: In process Updated: 01/13/23 1147    Specimen: Tissue         Final Active Diagnoses:    Diagnosis Date Noted POA    PRINCIPAL PROBLEM:  Pilonidal cyst with abscess [L05.01] 01/13/2023 Yes    Bipolar depression [F31.9] 01/05/2023 Yes    HTN (hypertension) [I10] 01/05/2023 Yes      Problems Resolved During this Admission:      Discharged Condition: good    Disposition: Home or Self Care    Follow Up:   Follow-up Information     Dorian Colon MD Follow up on 2/2/2023.    Specialty: General Surgery  Why: post op appt or as scheduled  Contact information:  [address]  [place] [address] [phone]                       Patient Instructions:      Diet general     Diet Adult Regular     Call MD for:  temperature >100.4     Call MD for:  persistent nausea and vomiting     Call MD for:  severe uncontrolled pain     Call MD for:  difficulty breathing, headache or visual disturbances     Call MD for:  redness, tenderness, or signs of infection (pain, swelling, redness, odor or green/yellow discharge around incision site)     Wound care routine (specify)   Order Comments: Wound care routine:  Soak in the tub or get in the shower and let the gauze in the wound get wet.  Removed the gauze and gently fill the wound with clean gauze.  Change the dressing once or twice a day as needed     Notify your health care provider if you experience any of the following:  increased confusion or weakness     Notify your health care provider if you experience any of the following:  persistent dizziness, light-headedness, or visual  disturbances     Notify your health care provider if you experience any of the following:  worsening rash     Notify your health care provider if you experience any of the following:  severe persistent headache     Notify your health care provider if you experience any of the following:  difficulty breathing or increased cough     Notify your health care provider if you experience any of the following:  severe uncontrolled pain     Notify your health care provider if you experience any of the following:  redness, tenderness, or signs of infection (pain, swelling, redness, odor or green/yellow discharge around incision site)     Notify your health care provider if you experience any of the following:  persistent nausea and vomiting or diarrhea     Notify your health care provider if you experience any of the following:  temperature >100.4     No driving until:   Order Comments: Off narcotic pain medication and safely react to other drivers     Change dressing (specify)   Order Comments: Dressing change: 1 times per day using kerlix or gauze.     Activity as tolerated     Activity as tolerated     Medications:  Reconciled Home Medications:      Medication List      START taking these medications    oxyCODONE-acetaminophen 7.5-325 mg per tablet  Commonly known as: PERCOCET  Take 1 tablet by mouth every 6 (six) hours as needed for Pain.        CONTINUE taking these medications    acetaminophen 325 MG tablet  Commonly known as: TYLENOL  Take 325 mg by mouth every 6 (six) hours as needed for Pain.     atorvastatin 20 MG tablet  Commonly known as: LIPITOR     ergocalciferol 50,000 unit Cap  Commonly known as: ERGOCALCIFEROL  Take 50,000 Units by mouth every 7 days.     HYDROcodone-acetaminophen  mg per tablet  Commonly known as: NORCO  Take 1 tablet by mouth once daily.     ibuprofen 600 MG tablet  Commonly known as: ADVIL,MOTRIN  Take 600 mg by mouth.          Time spent on the discharge of patient: 35  minutes    Fernando Almaguer MD  General Surgery  [facility]

## 2023-01-14 NOTE — HOSPITAL COURSE
Patient underwent a pilonidal cyst excision on 01/13/2023.  He was kept overnight due to some postoperative oozing from the wound in the recovery room.  He had a dressing change on the morning of postoperative day 1. Without evidence of further bleeding.  The wound was redressed and education was provided on wound care to both the patient and his partner.  He was discharged home in stable condition with follow-up with Dr. Colon as previously planned.

## 2023-01-19 LAB
FINAL PATHOLOGIC DIAGNOSIS: NORMAL
Lab: NORMAL

## 2023-02-02 ENCOUNTER — OFFICE VISIT (OUTPATIENT)
Dept: SURGERY | Facility: CLINIC | Age: 38
End: 2023-02-02
Payer: MEDICAID

## 2023-02-02 VITALS
HEIGHT: 71 IN | WEIGHT: 239.75 LBS | DIASTOLIC BLOOD PRESSURE: 91 MMHG | BODY MASS INDEX: 33.56 KG/M2 | HEART RATE: 121 BPM | TEMPERATURE: 99 F | SYSTOLIC BLOOD PRESSURE: 131 MMHG

## 2023-02-02 DIAGNOSIS — L05.01 PILONIDAL CYST WITH ABSCESS: Primary | ICD-10-CM

## 2023-02-02 PROCEDURE — 3075F PR MOST RECENT SYSTOLIC BLOOD PRESS GE 130-139MM HG: ICD-10-PCS | Mod: CPTII,,, | Performed by: SURGERY

## 2023-02-02 PROCEDURE — 3075F SYST BP GE 130 - 139MM HG: CPT | Mod: CPTII,,, | Performed by: SURGERY

## 2023-02-02 PROCEDURE — 3080F DIAST BP >= 90 MM HG: CPT | Mod: CPTII,,, | Performed by: SURGERY

## 2023-02-02 PROCEDURE — 1159F MED LIST DOCD IN RCRD: CPT | Mod: CPTII,,, | Performed by: SURGERY

## 2023-02-02 PROCEDURE — 99024 POSTOP FOLLOW-UP VISIT: CPT | Mod: ,,, | Performed by: SURGERY

## 2023-02-02 PROCEDURE — 3080F PR MOST RECENT DIASTOLIC BLOOD PRESSURE >= 90 MM HG: ICD-10-PCS | Mod: CPTII,,, | Performed by: SURGERY

## 2023-02-02 PROCEDURE — 99999 PR PBB SHADOW E&M-EST. PATIENT-LVL III: CPT | Mod: PBBFAC,,, | Performed by: SURGERY

## 2023-02-02 PROCEDURE — 3008F PR BODY MASS INDEX (BMI) DOCUMENTED: ICD-10-PCS | Mod: CPTII,,, | Performed by: SURGERY

## 2023-02-02 PROCEDURE — 99999 PR PBB SHADOW E&M-EST. PATIENT-LVL III: ICD-10-PCS | Mod: PBBFAC,,, | Performed by: SURGERY

## 2023-02-02 PROCEDURE — 99213 OFFICE O/P EST LOW 20 MIN: CPT | Mod: PBBFAC,PO | Performed by: SURGERY

## 2023-02-02 PROCEDURE — 1159F PR MEDICATION LIST DOCUMENTED IN MEDICAL RECORD: ICD-10-PCS | Mod: CPTII,,, | Performed by: SURGERY

## 2023-02-02 PROCEDURE — 3008F BODY MASS INDEX DOCD: CPT | Mod: CPTII,,, | Performed by: SURGERY

## 2023-02-02 PROCEDURE — 99024 PR POST-OP FOLLOW-UP VISIT: ICD-10-PCS | Mod: ,,, | Performed by: SURGERY

## 2023-02-02 RX ORDER — OXYCODONE AND ACETAMINOPHEN 7.5; 325 MG/1; MG/1
1 TABLET ORAL EVERY 6 HOURS PRN
Qty: 24 TABLET | Refills: 0 | Status: SHIPPED | OUTPATIENT
Start: 2023-02-02

## 2023-02-02 RX ORDER — GABAPENTIN 100 MG/1
100 CAPSULE ORAL 3 TIMES DAILY
COMMUNITY

## 2023-02-02 NOTE — PROGRESS NOTES
Subjective:       Patient ID: Jc Madrigal is a 37 y.o. male.    Moderately large pilonidal abscess    Chief Complaint: Cyst (Pilonidal... )    Patient had a moderately large pilonidal abscess.  He had an episode of postop bleeding.  He remained in the hospital overnight.  He was discharged home.  States that he went to the emergency room, possibly at [place] for wound care as he had another episode of bleeding.      The pain is now tolerable.  He still is having issues with hip pain.  Primary care started on Neurontin.  They are pursuing a further workup.  Review of Systems   Skin:         Bleeding from the pilonidal cyst excision site that has resolved     Objective:      Physical Exam  Vitals reviewed.   Constitutional:       Comments: A bit unkept   Skin:     Comments: The pilonidal wound is essentially clean there is however no gauze in it.  The gauze was replaced.   Neurological:      Mental Status: He is alert.         Final Pathologic Diagnosis RELIAPATH DIAGNOSIS:   SKIN, CYST, EXCISION:   - Benign skin showing underlying fibrosis and lymphohistiocytic infiltrate   with entrapped hair fragments, consistent with pilonidal cyst   Emergency room  note from [facility] was reviewed  Assessment:     Wound is healing well despite not the best wound care after excision of a pilonidal abscess.      Wound care was explained.    Plan:       Gauze to the wound once or twice a day.      Follow-up in 4 weeks.  One additional refill of narcotics.

## 2023-02-02 NOTE — PATIENT INSTRUCTIONS
You to shower and get the area wet.  Is very important that you get the gauze into the wound and not just on top of it.      Do not use tape just let the wound itself in your buttocks cheeks hold the gauze in place.  The table irritate you skin.      I have sent a refill of the Percocet to your pharmacy but after that you should do okay with the Neurontin or over-the-counter pain medicine.      You should hear from the office 1 day next week oral p.o. on your my chart application about an appointment to see us in a month

## 2023-02-23 ENCOUNTER — TELEPHONE (OUTPATIENT)
Dept: PAIN MEDICINE | Facility: CLINIC | Age: 38
End: 2023-02-23
Payer: MEDICAID

## 2023-02-23 NOTE — TELEPHONE ENCOUNTER
Advised that we do not have a referral and we have no open spots through August 2023. And advised  that we are interventional pain .

## 2023-02-23 NOTE — TELEPHONE ENCOUNTER
----- Message from Janice Morrell sent at 2/23/2023  3:19 PM CST -----  Contact: pt  Type:  Sooner Apoointment Request    Caller is requesting a sooner appointment.  Caller declined first available appointment listed below.  Caller will not accept being placed on the waitlist and is requesting a message be sent to doctor.  Name of Caller:pt  When is the first available appointment???  Symptoms:Severe hip and back pain  Would the patient rather a call back or a response via [facility]? call  Best Call Back Number:[phone]    Additional Information: Pt states his pcp referred him to schedule appt with provider. Please advise thank you

## 2023-02-28 ENCOUNTER — TELEPHONE (OUTPATIENT)
Dept: SURGERY | Facility: CLINIC | Age: 38
End: 2023-02-28
Payer: MEDICAID

## 2023-02-28 NOTE — TELEPHONE ENCOUNTER
Turned call to patient and scheduled follow u appointment with Dr. Colon. Patient is aware of date, time and location.

## 2023-02-28 NOTE — TELEPHONE ENCOUNTER
----- Message from Karina Dawn sent at 2/28/2023 11:07 AM CST -----  Pt stated he have an appt on 03/02/2023 or 03/03/2023. He stated Dr. Colon told him he will see him in a month. Plese call the pt back at .[phone] to advise. Swapnax. EL

## 2023-03-02 ENCOUNTER — OFFICE VISIT (OUTPATIENT)
Dept: SURGERY | Facility: CLINIC | Age: 38
End: 2023-03-02
Payer: MEDICAID

## 2023-03-02 VITALS — DIASTOLIC BLOOD PRESSURE: 95 MMHG | HEART RATE: 119 BPM | SYSTOLIC BLOOD PRESSURE: 147 MMHG

## 2023-03-02 DIAGNOSIS — L05.01 PILONIDAL CYST WITH ABSCESS: Primary | ICD-10-CM

## 2023-03-02 PROCEDURE — 3077F PR MOST RECENT SYSTOLIC BLOOD PRESSURE >= 140 MM HG: ICD-10-PCS | Mod: CPTII,,, | Performed by: SURGERY

## 2023-03-02 PROCEDURE — 1159F PR MEDICATION LIST DOCUMENTED IN MEDICAL RECORD: ICD-10-PCS | Mod: CPTII,,, | Performed by: SURGERY

## 2023-03-02 PROCEDURE — 1159F MED LIST DOCD IN RCRD: CPT | Mod: CPTII,,, | Performed by: SURGERY

## 2023-03-02 PROCEDURE — 3077F SYST BP >= 140 MM HG: CPT | Mod: CPTII,,, | Performed by: SURGERY

## 2023-03-02 PROCEDURE — 3080F PR MOST RECENT DIASTOLIC BLOOD PRESSURE >= 90 MM HG: ICD-10-PCS | Mod: CPTII,,, | Performed by: SURGERY

## 2023-03-02 PROCEDURE — 99024 PR POST-OP FOLLOW-UP VISIT: ICD-10-PCS | Mod: ,,, | Performed by: SURGERY

## 2023-03-02 PROCEDURE — 3080F DIAST BP >= 90 MM HG: CPT | Mod: CPTII,,, | Performed by: SURGERY

## 2023-03-02 PROCEDURE — 99213 OFFICE O/P EST LOW 20 MIN: CPT | Mod: PBBFAC,PO | Performed by: SURGERY

## 2023-03-02 PROCEDURE — 1160F RVW MEDS BY RX/DR IN RCRD: CPT | Mod: CPTII,,, | Performed by: SURGERY

## 2023-03-02 PROCEDURE — 99999 PR PBB SHADOW E&M-EST. PATIENT-LVL III: ICD-10-PCS | Mod: PBBFAC,,, | Performed by: SURGERY

## 2023-03-02 PROCEDURE — 1160F PR REVIEW ALL MEDS BY PRESCRIBER/CLIN PHARMACIST DOCUMENTED: ICD-10-PCS | Mod: CPTII,,, | Performed by: SURGERY

## 2023-03-02 PROCEDURE — 99024 POSTOP FOLLOW-UP VISIT: CPT | Mod: ,,, | Performed by: SURGERY

## 2023-03-02 PROCEDURE — 99999 PR PBB SHADOW E&M-EST. PATIENT-LVL III: CPT | Mod: PBBFAC,,, | Performed by: SURGERY

## 2023-03-02 NOTE — PATIENT INSTRUCTIONS
The wound is healing well.  Continue to put gauze on it once or twice a day for the next 2 weeks and then a little bit of antibiotic ointment.  We will see you back in 4 weeks      Our office phone numbers are  [phone] and

## 2023-03-02 NOTE — PROGRESS NOTES
Subjective:       Patient ID: Jc Madrigal is a 37 y.o. male.    Excision of a chronic pilonidal abscess    Chief Complaint: Follow-up (4 wk f/u)    Patient states he is doing better.  He still has some hip pain by these made progress in the workup for that.  He seeing a chiropractor that helps.  Feels the wound from the pilonidal abscess is getting small  Review of Systems   Skin:         Pilonidal site is getting small     Objective:      Physical Exam  Vitals reviewed.   Skin:     Comments: The pilonidal abscess site is nearly 90% closed.  Some excessive granulation tissue was treated with silver nitrate but this was minimal   Neurological:      Mental Status: He is alert.       Assessment:    The incision is about 90% healed.  Granulation tissue was cauterized    Plan:       Continue local wound care with gauze for 2 weeks.  Then antibiotic ointment.  Follow-up in 4 weeks

## 2024-08-06 NOTE — TELEPHONE ENCOUNTER
Called and spoke with pt about the following:     Please arrive to [facility] at 0745 am on 1/13/23 for your scheduled procedure.  Address: [address] [place] [address] [place] [address] ([address] [place] [address], [address] [facility])  >>>NO eating or drinking after midnight unless instructed otherwise by your Surgeon<<<    Thank you,  -[facility] Pre Admit Testing [facility].  Mon-Fri 8 am - 4 pm [phone]    
Epidural

## (undated) DEVICE — SOL NS 1000CC

## (undated) DEVICE — CONTAINER SPECIMEN OR STER 4OZ

## (undated) DEVICE — GOWN POLY REINF BRTH SLV XL

## (undated) DEVICE — APPLICATOR CHLORAPREP ORN 26ML

## (undated) DEVICE — SYR 10CC LUER LOCK

## (undated) DEVICE — SUT MONOCRYL 4.0 PS2 CP496G

## (undated) DEVICE — PACK BASIC SETUP SC BR

## (undated) DEVICE — GAUZE SPONGE 4X4 12PLY

## (undated) DEVICE — MANIFOLD 4 PORT

## (undated) DEVICE — INSERT CUSHIONPRONE VIEW LARGE

## (undated) DEVICE — DRAPE LAP T SHT W/ INSTR PAD

## (undated) DEVICE — COVER LIGHT HANDLE 80/CA

## (undated) DEVICE — PAD ABD 8X10 STERILE

## (undated) DEVICE — SUT VICRYL 3-0 27 SH

## (undated) DEVICE — ELECTRODE REM PLYHSV RETURN 9

## (undated) DEVICE — GLOVE SURG BIOGEL LATEX SZ 7.5

## (undated) DEVICE — TAPE SILK 3IN

## (undated) DEVICE — NDL SAFETY 25G X 1.5 ECLIPSE

## (undated) DEVICE — TUBING MEDI-VAC 20FT .25IN